# Patient Record
Sex: FEMALE | Race: WHITE | Employment: FULL TIME | URBAN - NONMETROPOLITAN AREA
[De-identification: names, ages, dates, MRNs, and addresses within clinical notes are randomized per-mention and may not be internally consistent; named-entity substitution may affect disease eponyms.]

---

## 2023-10-11 ENCOUNTER — OFFICE VISIT (OUTPATIENT)
Dept: PRIMARY CARE CLINIC | Age: 63
End: 2023-10-11
Payer: MEDICARE

## 2023-10-11 VITALS
BODY MASS INDEX: 29.89 KG/M2 | HEART RATE: 85 BPM | WEIGHT: 186 LBS | DIASTOLIC BLOOD PRESSURE: 74 MMHG | HEIGHT: 66 IN | OXYGEN SATURATION: 95 % | SYSTOLIC BLOOD PRESSURE: 108 MMHG

## 2023-10-11 DIAGNOSIS — Z78.0 POSTMENOPAUSAL ESTROGEN DEFICIENCY: ICD-10-CM

## 2023-10-11 DIAGNOSIS — L02.214 ABSCESS OF GROIN, RIGHT: Primary | ICD-10-CM

## 2023-10-11 DIAGNOSIS — Z13.1 SCREENING FOR DIABETES MELLITUS: ICD-10-CM

## 2023-10-11 DIAGNOSIS — Z12.31 SCREENING MAMMOGRAM, ENCOUNTER FOR: ICD-10-CM

## 2023-10-11 DIAGNOSIS — Z13.21 ENCOUNTER FOR VITAMIN DEFICIENCY SCREENING: ICD-10-CM

## 2023-10-11 DIAGNOSIS — Z13.83 SCREENING FOR CARDIOVASCULAR, RESPIRATORY, AND GENITOURINARY DISEASES: ICD-10-CM

## 2023-10-11 DIAGNOSIS — Z13.6 SCREENING FOR CARDIOVASCULAR, RESPIRATORY, AND GENITOURINARY DISEASES: ICD-10-CM

## 2023-10-11 DIAGNOSIS — Z13.29 SCREENING FOR THYROID DISORDER: ICD-10-CM

## 2023-10-11 DIAGNOSIS — Z13.220 SCREENING FOR HYPERLIPIDEMIA: ICD-10-CM

## 2023-10-11 DIAGNOSIS — Z13.89 SCREENING FOR CARDIOVASCULAR, RESPIRATORY, AND GENITOURINARY DISEASES: ICD-10-CM

## 2023-10-11 DIAGNOSIS — Z13.820 SCREENING FOR OSTEOPOROSIS: ICD-10-CM

## 2023-10-11 PROCEDURE — 99203 OFFICE O/P NEW LOW 30 MIN: CPT | Performed by: NURSE PRACTITIONER

## 2023-10-11 RX ORDER — DOXYCYCLINE HYCLATE 100 MG
100 TABLET ORAL 2 TIMES DAILY
Qty: 28 TABLET | Refills: 0 | Status: SHIPPED | OUTPATIENT
Start: 2023-10-11 | End: 2023-10-25

## 2023-10-11 RX ORDER — GABAPENTIN 600 MG/1
TABLET ORAL
COMMUNITY
Start: 2023-09-14

## 2023-10-11 SDOH — ECONOMIC STABILITY: FOOD INSECURITY: WITHIN THE PAST 12 MONTHS, YOU WORRIED THAT YOUR FOOD WOULD RUN OUT BEFORE YOU GOT MONEY TO BUY MORE.: NEVER TRUE

## 2023-10-11 SDOH — ECONOMIC STABILITY: INCOME INSECURITY: HOW HARD IS IT FOR YOU TO PAY FOR THE VERY BASICS LIKE FOOD, HOUSING, MEDICAL CARE, AND HEATING?: NOT HARD AT ALL

## 2023-10-11 SDOH — ECONOMIC STABILITY: FOOD INSECURITY: WITHIN THE PAST 12 MONTHS, THE FOOD YOU BOUGHT JUST DIDN'T LAST AND YOU DIDN'T HAVE MONEY TO GET MORE.: NEVER TRUE

## 2023-10-11 SDOH — ECONOMIC STABILITY: HOUSING INSECURITY
IN THE LAST 12 MONTHS, WAS THERE A TIME WHEN YOU DID NOT HAVE A STEADY PLACE TO SLEEP OR SLEPT IN A SHELTER (INCLUDING NOW)?: NO

## 2023-10-11 ASSESSMENT — PATIENT HEALTH QUESTIONNAIRE - PHQ9
1. LITTLE INTEREST OR PLEASURE IN DOING THINGS: 0
SUM OF ALL RESPONSES TO PHQ9 QUESTIONS 1 & 2: 0
SUM OF ALL RESPONSES TO PHQ QUESTIONS 1-9: 0
2. FEELING DOWN, DEPRESSED OR HOPELESS: 0
SUM OF ALL RESPONSES TO PHQ QUESTIONS 1-9: 0

## 2023-10-11 NOTE — PATIENT INSTRUCTIONS
Phone: [phone]  [facility]: [facility] Hours:  Monday: [place] office location 8-5 ([phone]) Offering additional late hours the first Monday of the month until 7 pm.   Tuesday: 8-5 Wednesday: 8-5 Thursday:  Additional hours offered 2 Thursdays a month. Please call to inquire those dates. Fridays: 7:30-4:30   SURVEY:    You may be receiving a survey from [employer] regarding your visit today. Please complete the survey to enable us to provide the highest quality of care to you and your family. If you cannot score us a very good on any question, please call the office to discuss how we could have made your experience a very good one. Thank you.
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
## 2023-10-12 ASSESSMENT — ENCOUNTER SYMPTOMS
EYES NEGATIVE: 1
CHEST TIGHTNESS: 0
COUGH: 0
WHEEZING: 0
SHORTNESS OF BREATH: 0
SORE THROAT: 0

## 2023-10-27 ENCOUNTER — HOSPITAL ENCOUNTER (OUTPATIENT)
Dept: BONE DENSITY | Age: 63
End: 2023-10-27
Payer: MEDICARE

## 2023-10-27 ENCOUNTER — HOSPITAL ENCOUNTER (OUTPATIENT)
Dept: MAMMOGRAPHY | Age: 63
End: 2023-10-27
Payer: MEDICARE

## 2023-10-27 DIAGNOSIS — Z13.820 SCREENING FOR OSTEOPOROSIS: ICD-10-CM

## 2023-10-27 DIAGNOSIS — Z78.0 POSTMENOPAUSAL ESTROGEN DEFICIENCY: ICD-10-CM

## 2023-10-27 DIAGNOSIS — Z12.31 SCREENING MAMMOGRAM, ENCOUNTER FOR: ICD-10-CM

## 2023-10-27 PROCEDURE — 77063 BREAST TOMOSYNTHESIS BI: CPT

## 2023-10-27 PROCEDURE — 77080 DXA BONE DENSITY AXIAL: CPT

## 2023-10-31 DIAGNOSIS — R92.8 ABNORMAL MAMMOGRAM OF RIGHT BREAST: Primary | ICD-10-CM

## 2023-11-06 ENCOUNTER — HOSPITAL ENCOUNTER (OUTPATIENT)
Age: 63
Discharge: HOME | End: 2023-11-06
Payer: MEDICARE

## 2023-11-06 DIAGNOSIS — Z13.6 SCREENING FOR CARDIOVASCULAR, RESPIRATORY, AND GENITOURINARY DISEASES: ICD-10-CM

## 2023-11-06 DIAGNOSIS — Z13.89 SCREENING FOR CARDIOVASCULAR, RESPIRATORY, AND GENITOURINARY DISEASES: ICD-10-CM

## 2023-11-06 DIAGNOSIS — Z13.1 SCREENING FOR DIABETES MELLITUS: ICD-10-CM

## 2023-11-06 DIAGNOSIS — Z13.83 SCREENING FOR CARDIOVASCULAR, RESPIRATORY, AND GENITOURINARY DISEASES: ICD-10-CM

## 2023-11-06 DIAGNOSIS — L02.214 ABSCESS OF GROIN, RIGHT: ICD-10-CM

## 2023-11-06 DIAGNOSIS — E55.9 VITAMIN D DEFICIENCY: Primary | ICD-10-CM

## 2023-11-06 LAB
ALBUMIN SERPL-MCNC: 4 G/DL (ref 3.5–5.2)
ALP SERPL-CCNC: 99 U/L (ref 35–104)
ALT SERPL-CCNC: 21 U/L (ref 5–33)
ANION GAP SERPL CALCULATED.3IONS-SCNC: 7 MMOL/L (ref 9–17)
AST SERPL-CCNC: 15 U/L
BASOPHILS # BLD: 0.06 K/UL (ref 0–0.2)
BASOPHILS NFR BLD: 1 % (ref 0–2)
BILIRUB SERPL-MCNC: 0.4 MG/DL (ref 0.3–1.2)
BUN SERPL-MCNC: 12 MG/DL (ref 8–23)
BUN/CREAT SERPL: 20 (ref 9–20)
CALCIUM SERPL-MCNC: 9.8 MG/DL (ref 8.6–10.4)
CHLORIDE SERPL-SCNC: 103 MMOL/L (ref 98–107)
CO2 SERPL-SCNC: 28 MMOL/L (ref 20–31)
CREAT SERPL-MCNC: 0.6 MG/DL (ref 0.5–0.9)
EOSINOPHIL # BLD: 0.13 K/UL (ref 0–0.4)
EOSINOPHILS RELATIVE PERCENT: 2 % (ref 0–5)
ERYTHROCYTE [DISTWIDTH] IN BLOOD BY AUTOMATED COUNT: 11.9 % (ref 12.1–15.2)
GFR SERPL CREATININE-BSD FRML MDRD: >60 ML/MIN/1.73M2
GLUCOSE SERPL-MCNC: 89 MG/DL (ref 70–99)
HCT VFR BLD AUTO: 47.4 % (ref 36–46)
HGB BLD-MCNC: 16.4 G/DL (ref 12–16)
IMM GRANULOCYTES # BLD AUTO: 0.01 K/UL (ref 0–0.3)
IMM GRANULOCYTES NFR BLD: 0 % (ref 0–5)
LYMPHOCYTES NFR BLD: 2.26 K/UL (ref 1–4.8)
LYMPHOCYTES RELATIVE PERCENT: 31 % (ref 15–40)
MCH RBC QN AUTO: 32.3 PG (ref 26–34)
MCHC RBC AUTO-ENTMCNC: 34.6 G/DL (ref 31–37)
MCV RBC AUTO: 93.5 FL (ref 80–100)
MONOCYTES NFR BLD: 0.44 K/UL (ref 0–1)
MONOCYTES NFR BLD: 6 % (ref 4–8)
NEUTROPHILS NFR BLD: 61 % (ref 47–75)
NEUTS SEG NFR BLD: 4.43 K/UL (ref 2.5–7)
PLATELET # BLD AUTO: 185 K/UL (ref 140–450)
PMV BLD AUTO: 10.5 FL (ref 6–12)
POTASSIUM SERPL-SCNC: 3.9 MMOL/L (ref 3.7–5.3)
PROT SERPL-MCNC: 6.9 G/DL (ref 6.4–8.3)
RBC # BLD AUTO: 5.07 M/UL (ref 4–5.2)
SODIUM SERPL-SCNC: 138 MMOL/L (ref 135–144)
WBC OTHER # BLD: 7.3 K/UL (ref 3.5–11)

## 2023-11-06 PROCEDURE — 93005 ELECTROCARDIOGRAM TRACING: CPT

## 2023-11-06 PROCEDURE — 36415 COLL VENOUS BLD VENIPUNCTURE: CPT

## 2023-11-06 PROCEDURE — 80053 COMPREHEN METABOLIC PANEL: CPT

## 2023-11-06 PROCEDURE — 85025 COMPLETE CBC W/AUTO DIFF WBC: CPT

## 2023-11-08 LAB
EKG ATRIAL RATE: 75 BPM
EKG P AXIS: 67 DEGREES
EKG P-R INTERVAL: 144 MS
EKG Q-T INTERVAL: 398 MS
EKG QRS DURATION: 94 MS
EKG QTC CALCULATION (BAZETT): 444 MS
EKG R AXIS: 59 DEGREES
EKG T AXIS: 50 DEGREES
EKG VENTRICULAR RATE: 75 BPM

## 2023-11-08 PROCEDURE — 93010 ELECTROCARDIOGRAM REPORT: CPT | Performed by: INTERNAL MEDICINE

## 2023-11-09 DIAGNOSIS — D58.2 ELEVATED HEMOGLOBIN (HCC): Primary | ICD-10-CM

## 2023-11-10 PROBLEM — Z13.820 SCREENING FOR OSTEOPOROSIS: Status: RESOLVED | Noted: 2023-10-11 | Resolved: 2023-11-10

## 2023-11-22 ENCOUNTER — OFFICE VISIT (OUTPATIENT)
Dept: PRIMARY CARE CLINIC | Age: 63
End: 2023-11-22
Payer: MEDICARE

## 2023-11-22 VITALS
BODY MASS INDEX: 31.18 KG/M2 | OXYGEN SATURATION: 97 % | HEIGHT: 66 IN | WEIGHT: 194 LBS | HEART RATE: 72 BPM | DIASTOLIC BLOOD PRESSURE: 74 MMHG | SYSTOLIC BLOOD PRESSURE: 130 MMHG

## 2023-11-22 DIAGNOSIS — M85.852 OSTEOPENIA OF BOTH HIPS: Primary | ICD-10-CM

## 2023-11-22 DIAGNOSIS — E55.9 VITAMIN D DEFICIENCY: ICD-10-CM

## 2023-11-22 DIAGNOSIS — M85.851 OSTEOPENIA OF BOTH HIPS: Primary | ICD-10-CM

## 2023-11-22 DIAGNOSIS — Z13.220 SCREENING CHOLESTEROL LEVEL: ICD-10-CM

## 2023-11-22 DIAGNOSIS — M79.10 MYALGIA, UNSPECIFIED SITE: ICD-10-CM

## 2023-11-22 DIAGNOSIS — Z13.29 SCREENING FOR THYROID DISORDER: ICD-10-CM

## 2023-11-22 PROCEDURE — 99213 OFFICE O/P EST LOW 20 MIN: CPT | Performed by: NURSE PRACTITIONER

## 2023-11-22 NOTE — PATIENT INSTRUCTIONS
Phone: [phone]  [facility]: [facility] Hours[facility]  Monday: [place] office location 8-5 ([phone]) Offering additional late hours the first Monday of the month until 7 pm.   Tuesday: 8-5 Wednesday: 8-5 Thursday:  Additional hours offered 2 Thursdays a month. Please call to inquire those dates. Fridays: 7:30-4:30   SURVEY:    You may be receiving a survey from [employer] regarding your visit today. Please complete the survey to enable us to provide the highest quality of care to you and your family. If you cannot score us a very good on any question, please call the office to discuss how we could have made your experience a very good one. Thank you.
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
## 2023-11-25 ASSESSMENT — ENCOUNTER SYMPTOMS
SHORTNESS OF BREATH: 0
SORE THROAT: 0
WHEEZING: 0
CHEST TIGHTNESS: 0
COUGH: 0
EYES NEGATIVE: 1

## 2023-11-28 ENCOUNTER — HOSPITAL ENCOUNTER (OUTPATIENT)
Age: 63
Discharge: HOME | End: 2023-11-28
Payer: MEDICARE

## 2023-11-28 ENCOUNTER — HOSPITAL ENCOUNTER (OUTPATIENT)
Dept: MAMMOGRAPHY | Age: 63
Discharge: HOME | End: 2023-11-30
Payer: MEDICARE

## 2023-11-28 ENCOUNTER — HOSPITAL ENCOUNTER (OUTPATIENT)
Dept: ULTRASOUND IMAGING | Age: 63
Discharge: HOME | End: 2023-11-30
Payer: MEDICARE

## 2023-11-28 DIAGNOSIS — R92.8 ABNORMAL MAMMOGRAM OF RIGHT BREAST: ICD-10-CM

## 2023-11-28 DIAGNOSIS — Z13.220 SCREENING CHOLESTEROL LEVEL: ICD-10-CM

## 2023-11-28 DIAGNOSIS — M79.10 MYALGIA, UNSPECIFIED SITE: ICD-10-CM

## 2023-11-28 DIAGNOSIS — M85.852 OSTEOPENIA OF BOTH HIPS: ICD-10-CM

## 2023-11-28 DIAGNOSIS — E55.9 VITAMIN D DEFICIENCY: ICD-10-CM

## 2023-11-28 DIAGNOSIS — M85.851 OSTEOPENIA OF BOTH HIPS: ICD-10-CM

## 2023-11-28 DIAGNOSIS — Z13.29 SCREENING FOR THYROID DISORDER: ICD-10-CM

## 2023-11-28 LAB — TSH SERPL DL<=0.05 MIU/L-ACNC: 1.41 UIU/ML (ref 0.3–5)

## 2023-11-28 PROCEDURE — 84443 ASSAY THYROID STIM HORMONE: CPT

## 2023-11-28 PROCEDURE — G0279 TOMOSYNTHESIS, MAMMO: HCPCS

## 2023-11-28 PROCEDURE — 82306 VITAMIN D 25 HYDROXY: CPT

## 2023-11-28 PROCEDURE — 76641 ULTRASOUND BREAST COMPLETE: CPT

## 2023-11-28 PROCEDURE — 80061 LIPID PANEL: CPT

## 2023-11-29 DIAGNOSIS — R92.8 ABNORMAL MAMMOGRAM OF RIGHT BREAST: Primary | ICD-10-CM

## 2023-11-29 LAB
25(OH)D3 SERPL-MCNC: 21.7 NG/ML
CHOLEST SERPL-MCNC: 277 MG/DL
CHOLESTEROL/HDL RATIO: 3.5
HDLC SERPL-MCNC: 79 MG/DL
LDLC SERPL CALC-MCNC: 172 MG/DL (ref 0–130)
TRIGL SERPL-MCNC: 132 MG/DL

## 2023-11-29 RX ORDER — MULTIVIT-MIN/IRON/FOLIC ACID/K 18-600-40
2000 CAPSULE ORAL DAILY
Qty: 30 CAPSULE | Refills: 11 | Status: SHIPPED | OUTPATIENT
Start: 2023-11-29

## 2023-11-29 RX ORDER — ROSUVASTATIN CALCIUM 5 MG/1
5 TABLET, COATED ORAL NIGHTLY
Qty: 90 TABLET | Refills: 0 | Status: SHIPPED | OUTPATIENT
Start: 2023-11-29

## 2023-12-13 ENCOUNTER — TELEPHONE (OUTPATIENT)
Dept: SURGERY | Age: 63
End: 2023-12-13

## 2023-12-13 NOTE — TELEPHONE ENCOUNTER
Patient was contacted to schedule her referral from Darian Ramirez CNP. The patient was offered an appointment on 12/18/2023. The patient chose to schedule her appointment until after the holidays at our [place] office. I explained That the type of biopsy that they are recommending is done in radiology. We scheduled her clinic visit and I will reach out to the breast navigator for guidance on the biopsy as the patient lives over 2 hours away.

## 2023-12-18 PROBLEM — D75.1 POLYCYTHEMIA: Status: ACTIVE | Noted: 2023-12-18

## 2023-12-18 PROBLEM — R92.8 ABNORMAL MAMMOGRAM: Status: ACTIVE | Noted: 2023-12-18

## 2023-12-26 DIAGNOSIS — R92.8 ABNORMAL MAMMOGRAM: Primary | ICD-10-CM

## 2024-01-19 ENCOUNTER — TELEPHONE (OUTPATIENT)
Dept: SURGERY | Age: 64
End: 2024-01-19

## 2024-01-19 NOTE — TELEPHONE ENCOUNTER
Patient was referred to Dr. Chaudhari for a BIRADS 4 right breast. The patient canceled her previous appointment that was scheduled. The patient verbalized ,\" I have a lot of Doctor's appointments and it's hard sometimes with transportation.\" The patient lives in [facility]. The patient scheduled an appointment with Dr. Chaudhari for 3/27/2024. The patient is aware that she had an abnormal right breast mammogram and that the radiologist recommends a right breast stereotactic biopsy and a breast exam with the Breast Surgeon. I explained the importance of timely care to the patient. The patient prefers to schedule in March at the [facility]. The date of 3/27/2024 is a patient preference date. She was offered multiple other date options. The patient was given the appointment time/location address. The patient was also provided with the [facility]'s breast navigator name and contact information to schedule the right breast biopsy. The patient's information was sent to the breast navigator for coordination of care.The patient verbalized understanding of the importance of a timely evaluation with the breast surgeon and a right breast biopsy per radiologist recommendation. The patient has no questions at this time.

## 2024-02-05 ENCOUNTER — TELEPHONE (OUTPATIENT)
Dept: WOMENS IMAGING | Age: 64
End: 2024-02-05

## 2024-02-05 NOTE — TELEPHONE ENCOUNTER
02/05/24  Message left for patient to return call to schedule her breast biopsy. She rescheduled her appointment with Dr. CHRISTIAN Chaudhari to 3/27/24 in [place]. I did explain on message that she would need to come to [place] to have the biopsy done and it could be done before her appointment. Will try again to reach patient.

## 2024-03-04 DIAGNOSIS — Z51.81 ENCOUNTER FOR MONITORING STATIN THERAPY: ICD-10-CM

## 2024-03-04 DIAGNOSIS — E78.00 HYPERCHOLESTEROLEMIA: Primary | ICD-10-CM

## 2024-03-04 DIAGNOSIS — Z79.899 ENCOUNTER FOR MONITORING STATIN THERAPY: ICD-10-CM

## 2024-03-04 NOTE — TELEPHONE ENCOUNTER
Pt completed first 3 months cholesterol med , due for repeat labs to make sure it worked well lipid and liver labs due in order to do refill    thanks

## 2024-03-04 NOTE — TELEPHONE ENCOUNTER
Last OV: 11/22/2023  Last RX: 11/29/2023   Next scheduled apt: 12/3/2024      Surescripts requesting refill

## 2024-03-05 NOTE — TELEPHONE ENCOUNTER
Called Loree with information on labs, she voiced understanding and will get it done as soon as she can.

## 2024-03-07 ENCOUNTER — HOSPITAL ENCOUNTER (OUTPATIENT)
Age: 64
Discharge: HOME | End: 2024-03-07
Payer: MEDICARE

## 2024-03-07 DIAGNOSIS — E78.00 HYPERCHOLESTEROLEMIA: ICD-10-CM

## 2024-03-07 DIAGNOSIS — Z79.899 ENCOUNTER FOR MONITORING STATIN THERAPY: ICD-10-CM

## 2024-03-07 DIAGNOSIS — Z51.81 ENCOUNTER FOR MONITORING STATIN THERAPY: ICD-10-CM

## 2024-03-07 LAB
ALBUMIN SERPL-MCNC: 4.1 G/DL (ref 3.5–5.2)
ALP SERPL-CCNC: 105 U/L (ref 35–104)
ALT SERPL-CCNC: 20 U/L (ref 5–33)
AST SERPL-CCNC: 16 U/L
BILIRUB DIRECT SERPL-MCNC: <0.1 MG/DL
BILIRUB INDIRECT SERPL-MCNC: ABNORMAL MG/DL (ref 0–1)
BILIRUB SERPL-MCNC: 0.5 MG/DL (ref 0.3–1.2)
CHOLEST SERPL-MCNC: 216 MG/DL (ref 0–199)
CHOLESTEROL/HDL RATIO: 2
HDLC SERPL-MCNC: 87 MG/DL
LDLC SERPL CALC-MCNC: 109 MG/DL (ref 0–100)
PROT SERPL-MCNC: 7 G/DL (ref 6.4–8.3)
TRIGL SERPL-MCNC: 100 MG/DL
VLDLC SERPL CALC-MCNC: 20 MG/DL

## 2024-03-07 PROCEDURE — 36415 COLL VENOUS BLD VENIPUNCTURE: CPT

## 2024-03-07 PROCEDURE — 80076 HEPATIC FUNCTION PANEL: CPT

## 2024-03-07 PROCEDURE — 80061 LIPID PANEL: CPT

## 2024-03-11 RX ORDER — ROSUVASTATIN CALCIUM 5 MG/1
5 TABLET, COATED ORAL NIGHTLY
Qty: 90 TABLET | Refills: 0 | OUTPATIENT
Start: 2024-03-11

## 2024-03-11 RX ORDER — ROSUVASTATIN CALCIUM 5 MG/1
5 TABLET, COATED ORAL NIGHTLY
Qty: 90 TABLET | Refills: 1 | Status: SHIPPED | OUTPATIENT
Start: 2024-03-11

## 2024-03-15 ENCOUNTER — HOSPITAL ENCOUNTER (OUTPATIENT)
Dept: WOMENS IMAGING | Age: 64
End: 2024-03-15
Attending: SURGERY
Payer: MEDICARE

## 2024-03-15 VITALS — HEART RATE: 68 BPM | RESPIRATION RATE: 20 BRPM | SYSTOLIC BLOOD PRESSURE: 148 MMHG | DIASTOLIC BLOOD PRESSURE: 78 MMHG

## 2024-03-15 DIAGNOSIS — R92.8 ABNORMAL MAMMOGRAM: ICD-10-CM

## 2024-03-15 PROCEDURE — 2580000003 HC RX 258: Performed by: RADIOLOGY

## 2024-03-15 PROCEDURE — 77065 DX MAMMO INCL CAD UNI: CPT

## 2024-03-15 PROCEDURE — 88305 TISSUE EXAM BY PATHOLOGIST: CPT

## 2024-03-15 PROCEDURE — 2500000003 HC RX 250 WO HCPCS: Performed by: RADIOLOGY

## 2024-03-15 PROCEDURE — A4217 STERILE WATER/SALINE, 500 ML: HCPCS | Performed by: RADIOLOGY

## 2024-03-15 PROCEDURE — 88342 IMHCHEM/IMCYTCHM 1ST ANTB: CPT

## 2024-03-15 PROCEDURE — 76098 X-RAY EXAM SURGICAL SPECIMEN: CPT

## 2024-03-15 PROCEDURE — 19081 BX BREAST 1ST LESION STRTCTC: CPT

## 2024-03-15 RX ORDER — LIDOCAINE HYDROCHLORIDE 20 MG/ML
20 INJECTION, SOLUTION INFILTRATION; PERINEURAL ONCE
Status: COMPLETED | OUTPATIENT
Start: 2024-03-15 | End: 2024-03-15

## 2024-03-15 RX ORDER — LIDOCAINE HYDROCHLORIDE AND EPINEPHRINE 10; 10 MG/ML; UG/ML
20 INJECTION, SOLUTION INFILTRATION; PERINEURAL ONCE
Status: COMPLETED | OUTPATIENT
Start: 2024-03-15 | End: 2024-03-15

## 2024-03-15 RX ORDER — MAGNESIUM HYDROXIDE 1200 MG/15ML
250 LIQUID ORAL CONTINUOUS
Status: DISCONTINUED | OUTPATIENT
Start: 2024-03-15 | End: 2024-03-18 | Stop reason: HOSPADM

## 2024-03-15 RX ORDER — MAGNESIUM HYDROXIDE 1200 MG/15ML
LIQUID ORAL CONTINUOUS PRN
Status: COMPLETED | OUTPATIENT
Start: 2024-03-15 | End: 2024-03-15

## 2024-03-15 RX ADMIN — LIDOCAINE HYDROCHLORIDE 9 ML: 20 INJECTION, SOLUTION INFILTRATION; PERINEURAL at 14:25

## 2024-03-15 RX ADMIN — LIDOCAINE HYDROCHLORIDE,EPINEPHRINE BITARTRATE 5 ML: 10; .01 INJECTION, SOLUTION INFILTRATION; PERINEURAL at 14:19

## 2024-03-15 RX ADMIN — SODIUM CHLORIDE 250 ML: 900 IRRIGANT IRRIGATION at 14:12

## 2024-03-15 ASSESSMENT — PAIN SCALES - GENERAL
PAINLEVEL_OUTOF10: 0
PAINLEVEL_OUTOF10: 0

## 2024-03-15 NOTE — DISCHARGE INSTRUCTIONS
Post Procedure Instructions for Breast Biopsies    You have had a breast biopsy of the Right Breast at [facility], which was ordered by Dr. CHRISTIAN Chaudhari    Activity  You may return to work or other activities the day of your biopsy, providing these activites do not require any heavy lifting or strenuous activity. We do recommend that you take the rest of the day following your biopsy just to rest.    Diet  You may resume your normal diet    Medications  1. Continue taking your normal medications, except products containing aspirin for 48 hours after your biopsy  2. You may take a mild pain reliever, as needed, such as Tylenol (Acetaminophen), Advil (Ibuprofen) or Motrin    Dressing  1. Wear your bra day and night for the remainder of today and tomorrow  2. Keep the ice pack on for 15 minutes at least 2 times today  3. You may shower and pat the dressing dry tomorrow.   On Sunday you may remove the dressing. The biopsy site should have started to heal at this point. At your discretion you can put a band-aid on it.    Other Instructions  1. You may have some bruising following the biopsy, that is normal because of the compression and it will heal and go away  2. For severyal days or even a couple of weeks, you may feel mild tenderness, \"twinges\", or even a small bump at the biopsy site. This is normal. Applying moist heat to the area may bring relief. This will disappear with time    If you develop any of the following symptoms, please contact your referring physician.  1. Active bleeding-hold compression to the site. If it does not stop call the radiology department  2. If you develop redness or heat at the biopsy site or a fever 5-7 days following your biopsy this could be a sign of an early infection    Please call with any questions or concerns.    Physician performing exam: Dr. CHRISTIAN Ayala    Please keep follow up appointment with Dr. CHRISTIAN Chaudhari on March 27th at 1:45 in  [place], [address].   [phone]    Your [facility] Team  Adela Gasca RN, CBEC   Phone: [phone]   Cell  [phone]    GER[facility]uto   Phone: [phone]    Radiology Phone Number: [phone]  [facility]: [phone]

## 2024-03-21 ENCOUNTER — TELEPHONE (OUTPATIENT)
Dept: WOMENS IMAGING | Age: 64
End: 2024-03-21

## 2024-03-21 NOTE — TELEPHONE ENCOUNTER
03/21/24  Spoke with patient on phone and gave her the results from her biopsy on the 15th. Patient verbalizes understanding of results. Patient was encouraged to keep her follow up appointment with Dr. CHRISTIAN Chaudhari so that she can thoroughly go over the pathology results in great detail and any follow up needed. Patient asking if possible that she can speak to Dr. Chaudhari on phone as traveling is difficult for her.  I told her I will call the office and ask and get back to her. Patient stated she will wait to hear back.

## 2024-06-17 ENCOUNTER — OFFICE VISIT (OUTPATIENT)
Dept: FAMILY MEDICINE CLINIC | Age: 64
End: 2024-06-17
Payer: MEDICARE

## 2024-06-17 VITALS — BODY MASS INDEX: 36 KG/M2 | WEIGHT: 224 LBS | HEIGHT: 66 IN | HEART RATE: 76 BPM

## 2024-06-17 DIAGNOSIS — L23.9 ALLERGIC CONTACT DERMATITIS, UNSPECIFIED TRIGGER: Primary | ICD-10-CM

## 2024-06-17 PROCEDURE — 99213 OFFICE O/P EST LOW 20 MIN: CPT | Performed by: LICENSED PRACTICAL NURSE

## 2024-06-17 RX ORDER — TRIAMCINOLONE ACETONIDE 1 MG/G
CREAM TOPICAL
Qty: 80 G | Refills: 0 | Status: SHIPPED | OUTPATIENT
Start: 2024-06-17

## 2024-06-17 RX ORDER — TIZANIDINE 4 MG/1
4 TABLET ORAL NIGHTLY
COMMUNITY
Start: 2023-12-14

## 2024-06-17 ASSESSMENT — PATIENT HEALTH QUESTIONNAIRE - PHQ9
SUM OF ALL RESPONSES TO PHQ QUESTIONS 1-9: 0
SUM OF ALL RESPONSES TO PHQ9 QUESTIONS 1 & 2: 0
2. FEELING DOWN, DEPRESSED OR HOPELESS: NOT AT ALL
SUM OF ALL RESPONSES TO PHQ QUESTIONS 1-9: 0
1. LITTLE INTEREST OR PLEASURE IN DOING THINGS: NOT AT ALL

## 2024-06-17 ASSESSMENT — ENCOUNTER SYMPTOMS
SHORTNESS OF BREATH: 0
EYE ITCHING: 1
EYE PAIN: 0

## 2024-06-17 NOTE — PROGRESS NOTES
Loree Cortes (:  1960) is a 64 y.o. female,Established patient, here for evaluation of the following chief complaint(s):  Rash (On face started 3 weeks ago. Was pulling weeds in flower beds. Day later started with a red rash on face and is itchy. )      Assessment & Plan   1. Allergic contact dermatitis, unspecified trigger  -     triamcinolone (KENALOG) 0.1 % cream; Apply to the affected area 2 times a day., Disp-80 g, R-0, Normal    Plan:     1. Allergic contact dermatitis, unspecified trigger  Contact dermatitis  Use Kenalog cream on face (avoid getting close to eyes) for 1 week  Call office if no improvement in 1 week  - triamcinolone (KENALOG) 0.1 % cream; Apply to the affected area 2 times a day.  Dispense: 80 g; Refill: 0      Return if symptoms worsen or fail to improve.       Subjective   Loree presents today with concerns for a rash on her face that started 3 weeks ago. She states she was weeding her flower beds and the next day she started with rash on her face. She feels rash is getting worse. She reports rash has blisters and is itchy. She has not tried to use any medications. She has tried to use lotion and keep her face clean.     Rash  This is a new problem. The current episode started 1 to 4 weeks ago. The problem has been gradually worsening since onset. The affected locations include the face. The rash is characterized by blistering and itchiness. She was exposed to plant contact. Pertinent negatives include no congestion, eye pain, fever or shortness of breath. Past treatments include moisturizer and anti-itch cream. The treatment provided mild relief. There is no history of eczema.       Review of Systems   Constitutional:  Negative for fever.   HENT:  Negative for congestion.    Eyes:  Positive for itching. Negative for pain.   Respiratory:  Negative for shortness of breath.    Cardiovascular:  Negative for chest pain and palpitations.   Skin:  Positive for rash.

## 2024-09-19 RX ORDER — ROSUVASTATIN CALCIUM 5 MG/1
TABLET, COATED ORAL
Qty: 90 TABLET | Refills: 1 | Status: SHIPPED | OUTPATIENT
Start: 2024-09-19

## 2024-09-23 ENCOUNTER — TELEPHONE (OUTPATIENT)
Dept: PAIN MANAGEMENT | Age: 64
End: 2024-09-23

## 2024-12-03 ENCOUNTER — OFFICE VISIT (OUTPATIENT)
Dept: PRIMARY CARE CLINIC | Age: 64
End: 2024-12-03
Payer: MEDICARE

## 2024-12-03 VITALS
HEART RATE: 84 BPM | SYSTOLIC BLOOD PRESSURE: 138 MMHG | OXYGEN SATURATION: 94 % | BODY MASS INDEX: 38.09 KG/M2 | DIASTOLIC BLOOD PRESSURE: 80 MMHG | WEIGHT: 237 LBS | HEIGHT: 66 IN

## 2024-12-03 DIAGNOSIS — Z71.89 ADVANCED DIRECTIVES, COUNSELING/DISCUSSION: ICD-10-CM

## 2024-12-03 DIAGNOSIS — Z13.29 SCREENING FOR THYROID DISORDER: ICD-10-CM

## 2024-12-03 DIAGNOSIS — R73.01 IMPAIRED FASTING BLOOD SUGAR: ICD-10-CM

## 2024-12-03 DIAGNOSIS — E78.00 MILD HYPERCHOLESTEROLEMIA: ICD-10-CM

## 2024-12-03 DIAGNOSIS — L71.9 ROSACEA, ACNE: ICD-10-CM

## 2024-12-03 DIAGNOSIS — Z00.00 WELCOME TO MEDICARE PREVENTIVE VISIT: Primary | ICD-10-CM

## 2024-12-03 DIAGNOSIS — Z12.11 COLON CANCER SCREENING: ICD-10-CM

## 2024-12-03 DIAGNOSIS — Z79.899 ENCOUNTER FOR MONITORING STATIN THERAPY: ICD-10-CM

## 2024-12-03 DIAGNOSIS — Z51.81 ENCOUNTER FOR MONITORING STATIN THERAPY: ICD-10-CM

## 2024-12-03 DIAGNOSIS — Z13.0 SCREENING FOR IRON DEFICIENCY ANEMIA: ICD-10-CM

## 2024-12-03 DIAGNOSIS — E55.9 VITAMIN D DEFICIENCY: ICD-10-CM

## 2024-12-03 PROCEDURE — G0402 INITIAL PREVENTIVE EXAM: HCPCS | Performed by: NURSE PRACTITIONER

## 2024-12-03 SDOH — ECONOMIC STABILITY: FOOD INSECURITY: WITHIN THE PAST 12 MONTHS, THE FOOD YOU BOUGHT JUST DIDN'T LAST AND YOU DIDN'T HAVE MONEY TO GET MORE.: NEVER TRUE

## 2024-12-03 SDOH — ECONOMIC STABILITY: FOOD INSECURITY: WITHIN THE PAST 12 MONTHS, YOU WORRIED THAT YOUR FOOD WOULD RUN OUT BEFORE YOU GOT MONEY TO BUY MORE.: NEVER TRUE

## 2024-12-03 SDOH — ECONOMIC STABILITY: INCOME INSECURITY: HOW HARD IS IT FOR YOU TO PAY FOR THE VERY BASICS LIKE FOOD, HOUSING, MEDICAL CARE, AND HEATING?: NOT HARD AT ALL

## 2024-12-03 ASSESSMENT — PATIENT HEALTH QUESTIONNAIRE - PHQ9
SUM OF ALL RESPONSES TO PHQ QUESTIONS 1-9: 2
SUM OF ALL RESPONSES TO PHQ QUESTIONS 1-9: 2
SUM OF ALL RESPONSES TO PHQ9 QUESTIONS 1 & 2: 2
2. FEELING DOWN, DEPRESSED OR HOPELESS: SEVERAL DAYS
1. LITTLE INTEREST OR PLEASURE IN DOING THINGS: SEVERAL DAYS
SUM OF ALL RESPONSES TO PHQ QUESTIONS 1-9: 2
SUM OF ALL RESPONSES TO PHQ QUESTIONS 1-9: 2

## 2024-12-03 ASSESSMENT — LIFESTYLE VARIABLES
HOW MANY STANDARD DRINKS CONTAINING ALCOHOL DO YOU HAVE ON A TYPICAL DAY: PATIENT DOES NOT DRINK
HOW OFTEN DO YOU HAVE A DRINK CONTAINING ALCOHOL: NEVER

## 2024-12-03 NOTE — PATIENT INSTRUCTIONS
poor eyesight can increase your risk of falling. So can some medicines. But there are things you can do to help prevent falls. You can exercise to get stronger. You can also arrange your home to make it safer.    Talk to your doctor about the medicines you take. Ask if any of them increase the risk of falls and whether they can be changed or stopped.   Try to exercise regularly. It can help improve your strength and balance. This can help lower your risk of falling.         Practice fall safety and prevention.   Wear low-heeled shoes that fit well and give your feet good support. Talk to your doctor if you have foot problems that make this hard.  Carry a cellphone or wear a medical alert device that you can use to call for help.  Use stepladders instead of chairs to reach high objects. Don't climb if you're at risk for falls. Ask for help, if needed.  Wear the correct eyeglasses, if you need them.        Make your home safer.   Remove rugs, cords, clutter, and furniture from walkways.  Keep your house well lit. Use night-lights in hallways and bathrooms.  Install and use sturdy handrails on stairways.  Wear nonskid footwear, even inside. Don't walk barefoot or in socks without shoes.        Be safe outside.   Use handrails, curb cuts, and ramps whenever possible.  Keep your hands free by using a shoulder bag or backpack.  Try to walk in well-lit areas. Watch out for uneven ground, changes in pavement, and debris.  Be careful in the winter. Walk on the grass or gravel when sidewalks are slippery. Use de-icer on steps and walkways. Add non-slip devices to shoes.    Put grab bars and nonskid mats in your shower or tub and near the toilet. Try to use a shower chair or bath bench when bathing.   Get into a tub or shower by putting in your weaker leg first. Get out with your strong side first. Have a phone or medical alert device in the bathroom with you.   Where can you learn more?  Go to

## 2024-12-04 ENCOUNTER — CLINICAL DOCUMENTATION (OUTPATIENT)
Dept: SPIRITUAL SERVICES | Age: 64
End: 2024-12-04

## 2024-12-04 NOTE — ACP (ADVANCE CARE PLANNING)
[] MyChart  [] Other (Specify) :              Outcomes:                [] 3rd -  Date:                Intervention:  [] Spoke with Patient   [] Left Voice mail [] Email / Mail    [] MyChart  [] Other (Specify) :       Outcomes:           []  Additional Outreach -  Date:     (Specify Dates & special circumstances):    Outcomes:         Thank you for this referral.

## 2024-12-06 ENCOUNTER — HOSPITAL ENCOUNTER (OUTPATIENT)
Age: 64
Discharge: HOME | End: 2024-12-06
Payer: MEDICARE

## 2024-12-06 DIAGNOSIS — Z13.29 SCREENING FOR THYROID DISORDER: ICD-10-CM

## 2024-12-06 DIAGNOSIS — E55.9 VITAMIN D DEFICIENCY: ICD-10-CM

## 2024-12-06 DIAGNOSIS — R73.01 IMPAIRED FASTING BLOOD SUGAR: ICD-10-CM

## 2024-12-06 DIAGNOSIS — E78.00 MILD HYPERCHOLESTEROLEMIA: ICD-10-CM

## 2024-12-06 DIAGNOSIS — Z51.81 ENCOUNTER FOR MONITORING STATIN THERAPY: ICD-10-CM

## 2024-12-06 DIAGNOSIS — Z79.899 ENCOUNTER FOR MONITORING STATIN THERAPY: ICD-10-CM

## 2024-12-06 LAB
25(OH)D3 SERPL-MCNC: 29.4 NG/ML (ref 30–100)
ALBUMIN SERPL-MCNC: 4.3 G/DL (ref 3.5–5.2)
ALP SERPL-CCNC: 122 U/L (ref 35–104)
ALT SERPL-CCNC: 17 U/L (ref 5–33)
ANION GAP SERPL CALCULATED.3IONS-SCNC: 11 MMOL/L (ref 9–17)
AST SERPL-CCNC: 16 U/L
BILIRUB SERPL-MCNC: 0.4 MG/DL (ref 0.3–1.2)
BUN SERPL-MCNC: 8 MG/DL (ref 8–23)
BUN/CREAT SERPL: 13 (ref 9–20)
CALCIUM SERPL-MCNC: 9.5 MG/DL (ref 8.6–10.4)
CHLORIDE SERPL-SCNC: 102 MMOL/L (ref 98–107)
CHOLEST SERPL-MCNC: 206 MG/DL (ref 0–199)
CHOLESTEROL/HDL RATIO: 3.1
CO2 SERPL-SCNC: 29 MMOL/L (ref 20–31)
CREAT SERPL-MCNC: 0.6 MG/DL (ref 0.5–0.9)
GFR, ESTIMATED: >90 ML/MIN/1.73M2
GLUCOSE SERPL-MCNC: 118 MG/DL (ref 70–99)
HDLC SERPL-MCNC: 66 MG/DL
LDLC SERPL CALC-MCNC: 114 MG/DL (ref 0–100)
POTASSIUM SERPL-SCNC: 5.2 MMOL/L (ref 3.7–5.3)
PROT SERPL-MCNC: 7.3 G/DL (ref 6.4–8.3)
SODIUM SERPL-SCNC: 142 MMOL/L (ref 135–144)
T4 FREE SERPL-MCNC: 0.7 NG/DL (ref 0.92–1.68)
TRIGL SERPL-MCNC: 130 MG/DL
TSH SERPL DL<=0.05 MIU/L-ACNC: 10.69 UIU/ML (ref 0.3–5)
VLDLC SERPL CALC-MCNC: 26 MG/DL (ref 1–30)

## 2024-12-06 PROCEDURE — 82306 VITAMIN D 25 HYDROXY: CPT

## 2024-12-06 PROCEDURE — 36415 COLL VENOUS BLD VENIPUNCTURE: CPT

## 2024-12-06 PROCEDURE — 84439 ASSAY OF FREE THYROXINE: CPT

## 2024-12-06 PROCEDURE — 80053 COMPREHEN METABOLIC PANEL: CPT

## 2024-12-06 PROCEDURE — 80061 LIPID PANEL: CPT

## 2024-12-06 PROCEDURE — 84443 ASSAY THYROID STIM HORMONE: CPT

## 2024-12-07 DIAGNOSIS — R73.01 IMPAIRED FASTING BLOOD SUGAR: ICD-10-CM

## 2024-12-07 DIAGNOSIS — E03.9 ACQUIRED HYPOTHYROIDISM: Primary | ICD-10-CM

## 2024-12-07 RX ORDER — LEVOTHYROXINE SODIUM 50 UG/1
50 TABLET ORAL DAILY
Qty: 60 TABLET | Refills: 0 | Status: SHIPPED | OUTPATIENT
Start: 2024-12-07

## 2024-12-12 RX ORDER — METRONIDAZOLE 7.5 MG/G
GEL TOPICAL
Qty: 45 G | Refills: 1 | Status: SHIPPED | OUTPATIENT
Start: 2024-12-12
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
## 2024-12-16 ENCOUNTER — OFFICE VISIT (OUTPATIENT)
Dept: FAMILY MEDICINE CLINIC | Age: 64
End: 2024-12-16
Payer: MEDICARE

## 2024-12-16 VITALS
HEART RATE: 75 BPM | WEIGHT: 239 LBS | DIASTOLIC BLOOD PRESSURE: 78 MMHG | SYSTOLIC BLOOD PRESSURE: 128 MMHG | OXYGEN SATURATION: 98 % | HEIGHT: 66 IN | BODY MASS INDEX: 38.41 KG/M2

## 2024-12-16 DIAGNOSIS — L28.2 PRURITIC RASH: ICD-10-CM

## 2024-12-16 DIAGNOSIS — L71.9 ROSACEA: Primary | ICD-10-CM

## 2024-12-16 PROCEDURE — 99213 OFFICE O/P EST LOW 20 MIN: CPT | Performed by: NURSE PRACTITIONER

## 2024-12-16 RX ORDER — FAMOTIDINE 40 MG/1
40 TABLET, FILM COATED ORAL EVERY EVENING
Qty: 30 TABLET | Refills: 3 | Status: SHIPPED | OUTPATIENT
Start: 2024-12-16

## 2024-12-16 RX ORDER — DOXYCYCLINE HYCLATE 100 MG
100 TABLET ORAL 2 TIMES DAILY
Qty: 14 TABLET | Refills: 0 | Status: SHIPPED | OUTPATIENT
Start: 2024-12-16 | End: 2024-12-23

## 2024-12-16 NOTE — PROGRESS NOTES
HPI Notes    Name: Loree Cortes  : 1960         Chief Complaint:     Chief Complaint   Patient presents with    Rash     Located on face (cheeks, nose, eyelids) x 2 days. Pt describes it as itchy       History of Present Illness:        HPI    Pt c/o worsening rosacea with starting treatment with metrogel. Rosacea on cheeks and upper eyelids is erythematous dry pt has not used anything to moisturize area and appears chapped. Offers used med x 2 days and worse. Also states \"I did not put it on my eyelids\"     Pt concerned about rosacea bright erythema present will offer pepcid for itching and I asked pt to use aquaphor 2 x daily to resolve.   Will offer doxycycline to Rx.  No steroids indicated does not appear to be reaction. Pt with sensitive skin but did not moisturize area with start.     Will refer to dermatology for further Rx due to eyelid involvement         Past Medical History:     Past Medical History:   Diagnosis Date    Acquired hypothyroidism 2024    Osteopenia of both hips       Reviewed all health maintenance requirements and ordered appropriate tests  Health Maintenance Due   Topic Date Due    HIV screen  Never done    Hepatitis C screen  Never done    Cervical cancer screen  Never done    Colorectal Cancer Screen  Never done    Shingles vaccine (1 of 2) Never done    Flu vaccine (1) Never done    COVID-19 Vaccine ( season) Never done    DTaP/Tdap/Td vaccine (2 - Td or Tdap) 10/02/2024       Past Surgical History:     Past Surgical History:   Procedure Laterality Date    KONRAD STEROTACTIC LOC BREAST BIOPSY RIGHT Right 3/15/2024    KONRAD STEROTACTIC LOC BREAST BIOPSY RIGHT 3/15/2024 [facility]    OTHER SURGICAL HISTORY      steal plate in right and left arm        Medications:       Prior to Admission medications    Medication Sig Start Date End Date Taking? Authorizing Provider   famotidine (PEPCID) 40 MG tablet Take 1 tablet by mouth every evening 24  Yes Sharri

## 2024-12-16 NOTE — PATIENT INSTRUCTIONS
THANK YOU FOR TRUSTING US WITH YOUR HEALTHCARE !!    The associates caring for you today were:    Family Practice Provider: Araceli LICEA-IVETH    Clinical Team Nurse: Jaimee Rosenberg LPN    Clinical Team Medical Assistant: Lisseth Ivan MA    Our goal is to provide you with EXCEPTIONAL patient care, and we strive to do this for EVERY patient, EVERY visit. Since we care about you and your experience, you may receive a patient satisfaction survey from [employer] by postal mail/email/text. We truly welcome your feedback and ask that you complete the survey to let us know how we are doing.    Important Numbers:  [facility] phone [phone]   Check Office Nurse/MA Line: [phone]  Fax  [phone]   Central [phone]  Billing questions: [phone]  Medical Records Request: [phone]    Manage your healthcare  with [facility] My[facility]. To activate your account, visit [email or web address]   DIGITAL scheduling available now through my chart.  Schedule your next appointment at your convenience through your my chart.       Check Office Hours:  Monday: [place] office location 8-5 ([phone]) Offering additional late hours the first Monday of the month until 7 pm.   Tuesday: 8: :  812 Noon, closed  of the month   : 7:30-4:30   SURVEY:    You may be receiving a survey from [employer] regarding your visit today.    Please complete the survey to enable us to provide the highest quality of care to you and your family.    If you cannot score us a very good on any question, please call the office to discuss how we could have made your experience a very good one.    Thank you.

## 2024-12-17 ASSESSMENT — ENCOUNTER SYMPTOMS
COUGH: 0
SHORTNESS OF BREATH: 0
CHEST TIGHTNESS: 0
EYES NEGATIVE: 1
SORE THROAT: 0
WHEEZING: 0

## 2024-12-27 RX ORDER — ACETAMINOPHEN 160 MG
TABLET,DISINTEGRATING ORAL
Qty: 30 CAPSULE | Refills: 11 | Status: SHIPPED | OUTPATIENT
Start: 2024-12-27

## 2024-12-27 NOTE — TELEPHONE ENCOUNTER
Last OV: 12/3/2024  Last RX: 11/29/2023   Next scheduled apt: 2/3/2025    Surescripts requesting refill

## 2025-02-03 ENCOUNTER — OFFICE VISIT (OUTPATIENT)
Dept: FAMILY MEDICINE CLINIC | Age: 65
End: 2025-02-03
Payer: MEDICARE

## 2025-02-03 ENCOUNTER — HOSPITAL ENCOUNTER (OUTPATIENT)
Age: 65
Discharge: HOME | End: 2025-02-03
Payer: MEDICARE

## 2025-02-03 VITALS
BODY MASS INDEX: 38.41 KG/M2 | SYSTOLIC BLOOD PRESSURE: 130 MMHG | HEIGHT: 66 IN | WEIGHT: 239 LBS | OXYGEN SATURATION: 95 % | DIASTOLIC BLOOD PRESSURE: 80 MMHG | HEART RATE: 82 BPM

## 2025-02-03 DIAGNOSIS — R73.01 IMPAIRED FASTING GLUCOSE: ICD-10-CM

## 2025-02-03 DIAGNOSIS — E03.9 ACQUIRED HYPOTHYROIDISM: ICD-10-CM

## 2025-02-03 DIAGNOSIS — E03.9 ACQUIRED HYPOTHYROIDISM: Primary | ICD-10-CM

## 2025-02-03 DIAGNOSIS — E88.810 METABOLIC SYNDROME: ICD-10-CM

## 2025-02-03 DIAGNOSIS — Z12.31 BREAST CANCER SCREENING BY MAMMOGRAM: ICD-10-CM

## 2025-02-03 DIAGNOSIS — L71.9 ROSACEA: ICD-10-CM

## 2025-02-03 LAB
HBA1C MFR BLD: 6.4 %
TSH SERPL DL<=0.05 MIU/L-ACNC: 3.88 UIU/ML (ref 0.3–5)

## 2025-02-03 PROCEDURE — 83036 HEMOGLOBIN GLYCOSYLATED A1C: CPT | Performed by: NURSE PRACTITIONER

## 2025-02-03 PROCEDURE — 36415 COLL VENOUS BLD VENIPUNCTURE: CPT

## 2025-02-03 PROCEDURE — 1123F ACP DISCUSS/DSCN MKR DOCD: CPT | Performed by: NURSE PRACTITIONER

## 2025-02-03 PROCEDURE — 84443 ASSAY THYROID STIM HORMONE: CPT

## 2025-02-03 PROCEDURE — 99213 OFFICE O/P EST LOW 20 MIN: CPT | Performed by: NURSE PRACTITIONER

## 2025-02-03 SDOH — ECONOMIC STABILITY: FOOD INSECURITY: WITHIN THE PAST 12 MONTHS, YOU WORRIED THAT YOUR FOOD WOULD RUN OUT BEFORE YOU GOT MONEY TO BUY MORE.: NEVER TRUE

## 2025-02-03 SDOH — ECONOMIC STABILITY: FOOD INSECURITY: WITHIN THE PAST 12 MONTHS, THE FOOD YOU BOUGHT JUST DIDN'T LAST AND YOU DIDN'T HAVE MONEY TO GET MORE.: NEVER TRUE

## 2025-02-03 ASSESSMENT — PATIENT HEALTH QUESTIONNAIRE - PHQ9
SUM OF ALL RESPONSES TO PHQ QUESTIONS 1-9: 0
1. LITTLE INTEREST OR PLEASURE IN DOING THINGS: NOT AT ALL
SUM OF ALL RESPONSES TO PHQ QUESTIONS 1-9: 0
2. FEELING DOWN, DEPRESSED OR HOPELESS: NOT AT ALL
SUM OF ALL RESPONSES TO PHQ9 QUESTIONS 1 & 2: 0

## 2025-02-03 ASSESSMENT — ENCOUNTER SYMPTOMS
EYES NEGATIVE: 1
COUGH: 0
SHORTNESS OF BREATH: 0
CHEST TIGHTNESS: 0
SORE THROAT: 0
WHEEZING: 0
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
## 2025-02-03 NOTE — PATIENT INSTRUCTIONS
THANK YOU FOR TRUSTING US WITH YOUR HEALTHCARE !!    The associates caring for you today were:    Family Practice Provider: Araceli LICEA-IVETH    Clinical Team Nurse: Jaimee Rosenberg LPN    Clinical Team Medical Assistant: Lisseth Ivan MA    Our goal is to provide you with EXCEPTIONAL patient care, and we strive to do this for EVERY patient, EVERY visit. Since we care about you and your experience, you may receive a patient satisfaction survey from [employer] by postal mail/email/text. We truly welcome your feedback and ask that you complete the survey to let us know how we are doing.    Important Numbers:  [facility] phone [phone]   [place] [facility] Nurse/MA Line: [phone]  Fax  [phone]   Central [phone]  Billing questions: [phone]  Medical Records Request: [phone]    Manage your healthcare  with [facility] My[facility]. To activate your account, visit [email or web address]   DIGITAL scheduling available now through my chart.  Schedule your next appointment at your convenience through your my chart.       [place] Office Hours:  Monday: [place] office location 8-5 ([phone]) Offering additional late hours the first Monday of the month until 7 pm.   Tuesday: 8: :  812 Noon, closed  of the month   : 7:30-4:30   SURVEY:    You may be receiving a survey from [employer] regarding your visit today.    Please complete the survey to enable us to provide the highest quality of care to you and your family.    If you cannot score us a very good on any question, please call the office to discuss how we could have made your experience a very good one.    Thank you.

## 2025-02-05 RX ORDER — LEVOTHYROXINE SODIUM 50 UG/1
50 TABLET ORAL DAILY
Qty: 90 TABLET | Refills: 1 | Status: SHIPPED | OUTPATIENT
Start: 2025-02-05

## 2025-02-06 PROBLEM — R73.01 IMPAIRED FASTING GLUCOSE: Status: ACTIVE | Noted: 2025-02-06

## 2025-02-06 PROBLEM — E03.9 ACQUIRED HYPOTHYROIDISM: Status: ACTIVE | Noted: 2025-02-06

## 2025-02-06 PROBLEM — E88.810 METABOLIC SYNDROME: Status: ACTIVE | Noted: 2025-02-06

## 2025-02-06 PROBLEM — L71.9 ROSACEA: Status: ACTIVE | Noted: 2025-02-06

## 2025-04-08 RX ORDER — ACETAMINOPHEN 160 MG
2000 TABLET,DISINTEGRATING ORAL DAILY
Qty: 30 CAPSULE | Refills: 11 | Status: SHIPPED | OUTPATIENT
Start: 2025-04-08

## 2025-04-08 RX ORDER — ROSUVASTATIN CALCIUM 5 MG/1
5 TABLET, COATED ORAL DAILY
Qty: 90 TABLET | Refills: 1 | Status: SHIPPED | OUTPATIENT
Start: 2025-04-08

## 2025-05-05 DIAGNOSIS — E03.9 ACQUIRED HYPOTHYROIDISM: ICD-10-CM

## 2025-05-05 RX ORDER — LEVOTHYROXINE SODIUM 50 UG/1
50 TABLET ORAL DAILY
Qty: 90 TABLET | Refills: 1 | Status: SHIPPED | OUTPATIENT
Start: 2025-05-05

## 2025-05-05 NOTE — TELEPHONE ENCOUNTER
Last OV: 2/3/2025  Last RX: 2/5/2025   Next scheduled apt: 12/5/2025    Surescripts requesting refill

## 2025-07-28 DIAGNOSIS — R73.01 IMPAIRED FASTING GLUCOSE: Primary | ICD-10-CM

## 2025-07-28 DIAGNOSIS — E88.810 METABOLIC SYNDROME: ICD-10-CM

## 2025-07-28 NOTE — TELEPHONE ENCOUNTER
Last OV: 2/3/2025  Last RX: 4/24/2025   Next scheduled apt: 12/5/2025    Surescripts requesting refill

## 2025-07-28 NOTE — TELEPHONE ENCOUNTER
Pt due for appt with metformin use and also needs labs Q 6 months. Last lab 12/2024.   CMP and appt please to continue use? Unsure if pt has had wt loss with this med? Has not followed up

## 2025-08-07 ENCOUNTER — HOSPITAL ENCOUNTER (OUTPATIENT)
Dept: LAB | Age: 65
Discharge: HOME | End: 2025-08-07
Payer: MEDICARE

## 2025-08-07 DIAGNOSIS — R73.01 IMPAIRED FASTING BLOOD SUGAR: ICD-10-CM

## 2025-08-07 DIAGNOSIS — E88.810 METABOLIC SYNDROME: ICD-10-CM

## 2025-08-07 DIAGNOSIS — E03.9 ACQUIRED HYPOTHYROIDISM: ICD-10-CM

## 2025-08-07 DIAGNOSIS — R73.01 IMPAIRED FASTING GLUCOSE: ICD-10-CM

## 2025-08-07 LAB
ANION GAP SERPL CALCULATED.3IONS-SCNC: 11 MMOL/L (ref 9–17)
BUN SERPL-MCNC: 9 MG/DL (ref 8–23)
CALCIUM SERPL-MCNC: 9.7 MG/DL (ref 8.6–10.4)
CHLORIDE SERPL-SCNC: 104 MMOL/L (ref 98–107)
CO2 SERPL-SCNC: 28 MMOL/L (ref 20–31)
CREAT SERPL-MCNC: 0.7 MG/DL (ref 0.5–0.9)
EST. AVERAGE GLUCOSE BLD GHB EST-MCNC: 120 MG/DL
GFR, ESTIMATED: >90 ML/MIN/1.73M2
GLUCOSE SERPL-MCNC: 149 MG/DL (ref 70–99)
HBA1C MFR BLD: 5.8 % (ref 4–6)
POTASSIUM SERPL-SCNC: 5.2 MMOL/L (ref 3.7–5.3)
SODIUM SERPL-SCNC: 143 MMOL/L (ref 135–144)
T4 FREE SERPL-MCNC: 1 NG/DL (ref 0.92–1.68)
TSH SERPL DL<=0.05 MIU/L-ACNC: 5.34 UIU/ML (ref 0.27–4.2)

## 2025-08-07 PROCEDURE — 36415 COLL VENOUS BLD VENIPUNCTURE: CPT

## 2025-08-07 PROCEDURE — 83036 HEMOGLOBIN GLYCOSYLATED A1C: CPT

## 2025-08-07 PROCEDURE — 84443 ASSAY THYROID STIM HORMONE: CPT

## 2025-08-07 PROCEDURE — 84439 ASSAY OF FREE THYROXINE: CPT

## 2025-08-07 PROCEDURE — 80048 BASIC METABOLIC PNL TOTAL CA: CPT

## 2025-08-11 ENCOUNTER — OFFICE VISIT (OUTPATIENT)
Dept: FAMILY MEDICINE CLINIC | Age: 65
End: 2025-08-11

## 2025-08-11 VITALS
HEIGHT: 66 IN | DIASTOLIC BLOOD PRESSURE: 70 MMHG | WEIGHT: 241 LBS | BODY MASS INDEX: 38.73 KG/M2 | HEART RATE: 90 BPM | SYSTOLIC BLOOD PRESSURE: 140 MMHG | OXYGEN SATURATION: 94 %

## 2025-08-11 DIAGNOSIS — Z12.31 BREAST CANCER SCREENING BY MAMMOGRAM: ICD-10-CM

## 2025-08-11 DIAGNOSIS — Z98.890 HX OF RIGHT BREAST BIOPSY: ICD-10-CM

## 2025-08-11 DIAGNOSIS — G63 POLYNEUROPATHY ASSOCIATED WITH UNDERLYING DISEASE: ICD-10-CM

## 2025-08-11 DIAGNOSIS — R03.0 ELEVATED BP WITHOUT DIAGNOSIS OF HYPERTENSION: ICD-10-CM

## 2025-08-11 DIAGNOSIS — E03.9 ACQUIRED HYPOTHYROIDISM: Primary | ICD-10-CM

## 2025-08-11 DIAGNOSIS — R22.43 LOCALIZED SWELLING OF BOTH LOWER LEGS: ICD-10-CM

## 2025-08-11 DIAGNOSIS — R73.01 IMPAIRED FASTING GLUCOSE: ICD-10-CM

## 2025-08-11 DIAGNOSIS — E88.810 METABOLIC SYNDROME: ICD-10-CM

## 2025-08-11 RX ORDER — AMITRIPTYLINE HYDROCHLORIDE 10 MG/1
10 TABLET ORAL NIGHTLY
Qty: 30 TABLET | Refills: 0 | Status: SHIPPED | OUTPATIENT
Start: 2025-08-11

## 2025-08-11 RX ORDER — LEVOTHYROXINE SODIUM 75 UG/1
75 TABLET ORAL DAILY
Qty: 60 TABLET | Refills: 0 | Status: SHIPPED | OUTPATIENT
Start: 2025-08-11

## 2025-08-21 ENCOUNTER — TELEPHONE (OUTPATIENT)
Dept: PRIMARY CARE CLINIC | Age: 65
End: 2025-08-21

## 2025-08-21 ASSESSMENT — ENCOUNTER SYMPTOMS
RHINORRHEA: 0
WHEEZING: 0
NAUSEA: 0
SHORTNESS OF BREATH: 0
SORE THROAT: 0
COUGH: 0
CHEST TIGHTNESS: 0
EYES NEGATIVE: 1
CONSTIPATION: 0
DIARRHEA: 0